# Patient Record
Sex: FEMALE | Race: WHITE
[De-identification: names, ages, dates, MRNs, and addresses within clinical notes are randomized per-mention and may not be internally consistent; named-entity substitution may affect disease eponyms.]

---

## 2017-04-18 ENCOUNTER — HOSPITAL ENCOUNTER (OUTPATIENT)
Dept: HOSPITAL 84 - D.NM | Age: 82
Discharge: HOME | End: 2017-04-18
Attending: FAMILY MEDICINE
Payer: MEDICARE

## 2017-04-18 VITALS — BODY MASS INDEX: 29.9 KG/M2

## 2017-04-18 DIAGNOSIS — K62.5: Primary | ICD-10-CM

## 2017-05-29 ENCOUNTER — HOSPITAL ENCOUNTER (OUTPATIENT)
Dept: HOSPITAL 84 - D.ER | Age: 82
Setting detail: OBSERVATION
LOS: 2 days | Discharge: HOME | End: 2017-05-31
Attending: FAMILY MEDICINE | Admitting: FAMILY MEDICINE
Payer: MEDICARE

## 2017-05-29 VITALS
HEIGHT: 66 IN | BODY MASS INDEX: 27.16 KG/M2 | BODY MASS INDEX: 27.16 KG/M2 | WEIGHT: 169 LBS | WEIGHT: 169 LBS | BODY MASS INDEX: 27.16 KG/M2 | HEIGHT: 66 IN

## 2017-05-29 VITALS — SYSTOLIC BLOOD PRESSURE: 115 MMHG | DIASTOLIC BLOOD PRESSURE: 49 MMHG

## 2017-05-29 VITALS — DIASTOLIC BLOOD PRESSURE: 49 MMHG | SYSTOLIC BLOOD PRESSURE: 115 MMHG

## 2017-05-29 DIAGNOSIS — Z79.01: ICD-10-CM

## 2017-05-29 DIAGNOSIS — K58.0: ICD-10-CM

## 2017-05-29 DIAGNOSIS — E78.5: ICD-10-CM

## 2017-05-29 DIAGNOSIS — K57.90: ICD-10-CM

## 2017-05-29 DIAGNOSIS — I48.91: ICD-10-CM

## 2017-05-29 DIAGNOSIS — K64.8: Primary | ICD-10-CM

## 2017-05-29 DIAGNOSIS — F32.9: ICD-10-CM

## 2017-05-29 LAB
ALBUMIN SERPL-MCNC: 3.6 G/DL (ref 3.4–5)
ALP SERPL-CCNC: 75 U/L (ref 46–116)
ALT SERPL-CCNC: 16 U/L (ref 10–68)
ANION GAP SERPL CALC-SCNC: 12.5 MMOL/L (ref 8–16)
APTT BLD: 31.7 SECONDS (ref 22.8–39.4)
BASOPHILS NFR BLD AUTO: 0.6 % (ref 0–2)
BILIRUB SERPL-MCNC: 0.91 MG/DL (ref 0.2–1.3)
BUN SERPL-MCNC: 25 MG/DL (ref 7–18)
CALCIUM SERPL-MCNC: 10 MG/DL (ref 8.5–10.1)
CHLORIDE SERPL-SCNC: 104 MMOL/L (ref 98–107)
CO2 SERPL-SCNC: 29.1 MMOL/L (ref 21–32)
CREAT SERPL-MCNC: 1 MG/DL (ref 0.6–1.3)
EOSINOPHIL NFR BLD: 3.9 % (ref 0–7)
ERYTHROCYTE [DISTWIDTH] IN BLOOD BY AUTOMATED COUNT: 13.1 % (ref 11.5–14.5)
GLOBULIN SER-MCNC: 4.1 G/L
GLUCOSE SERPL-MCNC: 108 MG/DL (ref 74–106)
HCT VFR BLD CALC: 37.9 % (ref 36–48)
HCT VFR BLD CALC: 40.6 % (ref 36–48)
HGB BLD-MCNC: 12.7 G/DL (ref 12–16)
HGB BLD-MCNC: 13.4 G/DL (ref 12–16)
IMM GRANULOCYTES NFR BLD: 0.1 % (ref 0–5)
INR PPP: 1.51 (ref 0.85–1.17)
LYMPHOCYTES NFR BLD AUTO: 36.1 % (ref 15–50)
MCH RBC QN AUTO: 30.6 PG (ref 26–34)
MCHC RBC AUTO-ENTMCNC: 33 G/DL (ref 31–37)
MCV RBC: 92.7 FL (ref 80–100)
MONOCYTES NFR BLD: 13 % (ref 2–11)
NEUTROPHILS NFR BLD AUTO: 46.3 % (ref 40–80)
OSMOLALITY SERPL CALC.SUM OF ELEC: 287 MOSM/KG (ref 275–300)
PLATELET # BLD: 126 10X3/UL (ref 130–400)
PMV BLD AUTO: 12.9 FL (ref 7.4–10.4)
POTASSIUM SERPL-SCNC: 3.6 MMOL/L (ref 3.5–5.1)
PROT SERPL-MCNC: 7.7 G/DL (ref 6.4–8.2)
PROTHROMBIN TIME: 18.1 SECONDS (ref 11.6–15)
RBC # BLD AUTO: 4.38 10X6/UL (ref 4–5.4)
SODIUM SERPL-SCNC: 142 MMOL/L (ref 136–145)
WBC # BLD AUTO: 7.7 10X3/UL (ref 4.8–10.8)

## 2017-05-29 NOTE — PRO
PATIENT:ANTONIA BURNS                             MEDICAL RECORD: [id number]
: 33                                            LOCATION:[facility]
                                                         ADMISSION DATE: 17
 
PROCEDURE PERFORMED BY: JULIO CÉSAR PATEL MD              
 
 
DATE OF PROCEDURE:  2017
 
:  Julio César Patel MD.
 
PROCEDURE:  Flexible sigmoidoscopy with hemorrhoidal banding times 5.
 
INDICATION:  The patient is an 84-year-old white female who is basically
admitted with recurrent problems of hematochezia since starting Xarelto.  She
had a colonoscopy a little less than 10 years ago that was normal, there is
evidence of small internal hemorrhoids and mild sigmoid diverticulosis.  We have
not seen her since.  Over the past year or so, since she started Xarelto, she
has off and on hematochezia.  She did require transfusion as of yet.  She is now
for sigmoidoscopy.
 
PREMEDICATION:  TIVA anesthesia.
 
INSTRUMENT:  Olympus video adjustable colonoscope and then gastroscope.
 
FINDINGS:  Rectal exam was normal.  The colonoscope passed through the rectum
into the splenic flexure without difficulty.  The exam was remarkable for brown
stool, mild sigmoid diverticulosis and moderate friable internal hemorrhoids on
retroflexion of the rectum.  There were no mass, lesions or polypoid lesions
seen.
 
I then elected to band her hemorrhoids.  This was done by removing the
colonoscope and placing 7-shooter  on the tip of the gastroscope and
inserting into the patient.  Using retroflexed view, I placed 5 hemorrhoid bands
just proximal to the dentate line in a circumferential manner.  The patient
tolerated the procedure well without any immediate complications.
 
IMPRESSION:
1.  Moderate-sized friable internal hemorrhoids, now status post hemorrhoidal
banding times 5 as noted above.
2.  Mild sigmoid diverticulosis.
3.  Otherwise normal sigmoidoscopy.
4.  Hematochezia obviously due to her hemorrhoids and Xarelto
 
PLAN:
1.  Avoid constipation and straining with bowel movement.
2.  We will see her on as needed basis.
3.  Consider repeat hemorrhoid banding in the future as needed.
 
TRANSINT:[id number] Voice Confirmation ID: [id number] DOCUMENT ID: [id number]
                                           
 
 
 
PROCEDURE NOTE                                 [id number]    ANTONIA BURNS    
 
 
                                           JULIO CÉSAR PATEL MD              
 
 
 
 
 
 
 
 
 
 
 
 
 
 
 
 
 
 
 
 
 
 
 
 
 
 
 
 
 
 
 
 
 
 
 
 
 
 
 
 
 
 
 
 
 
CC: JULIO CÉSAR PEREIRA MD                                  [id number]
DICTATION DATE: 17 1235     :     17 1054      DIS IN  
                                                                      17
[facility]                                          
[address] [place] [address]

## 2017-05-29 NOTE — NUR
REC'D PER STRETCHER FROM ER DEPT AN 85 Y/O W/F PER SERVICES DR. PEREIRA
WITH DX.LOWER GI BLEED. SALINE LOCK PATENT LEFT HAND. SITE CLEAR ALLERGY:MILK.
SPLINT TO RT INDEX FINGER FROM A RECENT FX FROM A FALL.

## 2017-05-30 VITALS — DIASTOLIC BLOOD PRESSURE: 50 MMHG | SYSTOLIC BLOOD PRESSURE: 116 MMHG

## 2017-05-30 VITALS — SYSTOLIC BLOOD PRESSURE: 136 MMHG | DIASTOLIC BLOOD PRESSURE: 56 MMHG

## 2017-05-30 VITALS — DIASTOLIC BLOOD PRESSURE: 56 MMHG | SYSTOLIC BLOOD PRESSURE: 136 MMHG

## 2017-05-30 VITALS — DIASTOLIC BLOOD PRESSURE: 48 MMHG | SYSTOLIC BLOOD PRESSURE: 116 MMHG

## 2017-05-30 VITALS — DIASTOLIC BLOOD PRESSURE: 52 MMHG | SYSTOLIC BLOOD PRESSURE: 110 MMHG

## 2017-05-30 LAB
HCT VFR BLD CALC: 36.6 % (ref 36–48)
HCT VFR BLD CALC: 38.2 % (ref 36–48)
HGB BLD-MCNC: 12 G/DL (ref 12–16)
HGB BLD-MCNC: 12.4 G/DL (ref 12–16)

## 2017-05-30 NOTE — NUR
PATIENT IS LAYING IN BED, HOB 40 DEGREES. RESPIRATIONS ARE EVEN AND UNLABORED.
NO SIGNS OF DISTRESS NOTED. BED IN LOWEST POSITION, CALL LIGHT IN REACH. BED
RIALS UP X'S 2. FINGER SPLINT ON AND INTACT. PATIENT'S FAMILY MEMBER IS AT
BEDSIDE, BOTH DENY NEEDS AT THIS TIME.

## 2017-05-30 NOTE — NUR
ASSESSMENT AS PER FLOWSHEET. IV PATENT LEFT HAND OF NS AT 75CC'S/HR SITE
CLEAR. SPLINT IMMOBILIZER IN PLACE TO RT INDEX FINGER. RESTING QUIETLY DENIES
NEEDS. SR UP X2 CALL LIGHT WITHIN REACH.

## 2017-05-30 NOTE — NUR
PATIENT IN MID NUNEZ POSITION RESTING QUIETLY. RESPIRATIONS EVEN AND
UNLABORED. DENIES NEEDS. SIDE RAILS UP X2. BED IN LOW POSITION. CALL LIGHT IN
REACH.

## 2017-05-30 NOTE — HP
PATIENT: ANTONIA BURNS                            MEDICAL RECORD: [id number]
ACCOUNT: [id number]                                    LOCATION:[facility]
: 33                                            ADMISSION DATE: 17
                                                         
 
                             HISTORY AND PHYSICAL EXAMINATION
 
 
DATE OF ADMISSION:  2017.
 
CHIEF COMPLAINT:  Blood per rectum.
 
HISTORY OF PRESENT ILLNESS:  This is an 84-year-old female followed by Dr. Combs in our office.  She tells me that she has had rectal bleeding for a
couple of days now, is bright red, she may have a little pain, but not too much.
 I have reviewed records from our office and the patient was actually seen by
Dr. Combs on 2017 complaining of bright red blood per rectum.  She
had some blood work done and I asked the patient if she remembered going to see
Dr. Combs about this visit, she stated she does not remember that at all. 
____ note the patient has an acute memory issue or she has long-term or
worsening issues.  She did have heme-positive rectal test in the ER.  Her
hemoglobin is 12.7, hematocrit is 37.9.  Review of family medicine clinic
records on 2017, her hemoglobin was 12.5 and hematocrit 38.8.
 
PAST MEDICAL AND SURGICAL HISTORY:  The patient has arthritis, atrial
fibrillation, depression, hyperlipidemia, irritable bowel syndrome.
 
PAST SURGICAL HISTORY:  Hysterectomy.
 
HOME MEDICATIONS:  (This is the best list that I can come up which she takes
Viberzi 100 mg daily, Voltaren gel daily, citalopram 10 mg daily, pravastatin 10
mg daily, bisoprolol/HCTZ 2.5/6.25 once a day, Xarelto 20 mg once a day.  She
takes tramadol p.r.n. pain, hydrocodone 5 p.r.n. pain, cyclobenzaprine 5 p.r.n.
muscle spasm.
 
DRUG ALLERGIES:  Unknown.
 
HABITS:  Never smoked.  No alcohol or drugs.
 
SOCIAL HISTORY:  She is retired, lives in the [facility] with assisted living.  Her
daughter lives ____ and is her POA.
 
FAMILY HISTORY:  Parents are .  Father  reportedly of renal cell
cancer.
 
REVIEW OF SYSTEMS:
GENERAL:  She denies any major weight changes.
HEENT:  No particular sinus or allergy problems.
RESPIRATORY:  No history of asthma or emphysema.
CARDIAC:  No known coronary artery disease.  She has atrial fibrillation, on
Xarelto for the last few months (reported that she was on Coumadin prior to
that).
GASTROINTESTINAL:  She has irritable bowel.  Dr. Rock is her
gastroenterologist since initial exam on 2017.
MUSCULOSKELETAL:  She has arthritic aches and pains.
NEUROLOGIC:  No significant headaches or seizures.  Her mental status is
unknown.  The patient states her daughter can answer those questions.
PSYCHIATRIC:  She has some depression.
 
 
 
HISTORY AND PHYSICAL                           [id number]    ANTONIA BURNS    
 
 
 
PHYSICAL EXAMINATION:
VITAL SIGNS:  Today, temperature 98.1, pulse 60, respirations 20, blood pressure
115/49, O2 sat 93%.
GENERAL:  She is easily awakened tonight.  She does not appear in acute
distress.
SKIN:  Warm and dry.
HEENT:  Grossly within normal limits.
NECK:  Supple.  No JVD or bruit.
HEART:  Regular rate and rhythm at this time.
LUNGS:  Fairly clear.
ABDOMEN:  Soft, flat, nontender.
EXTREMITIES:  No edema.
 
LABORATORY WORK:  CBC with a white count 7700, hemoglobin 12.7, hematocrit 37.9,
platelets number 126,000.  INR 1.5.  LFTs are normal.  Basic metabolic panel is
okay except BUN 25.  The last CBC and basic metabolic panel in our office on
2017, these were similar, hemoglobin was 12.5, hematocrit 38.8.  Rectal
exam in the ER was reportedly heme-positive.
 
ASSESSMENT:
1.  Heme-positive stool.
2.  Atrial fibrillation on Xarelto.
 
PLAN:  Gastrointestinal has been consulted, ____ and Dr. Rock's nurse
practitioner note from 2017, the patient does not wish to have any further
colonoscopies, so not really sure about further workup is going be done.  Again
over a month ago, her hemoglobin was actually a little bit lower than it is now.
 Dr. Combs had ordered a bleeding scan and that was done at [facility]
on 2017.  The bleeding scan showed no obvious signs of bleeding.  Other
tests and procedures as warranted.
 
TRANSINT:[id number] Voice Confirmation ID: [id number] DOCUMENT ID: [id number]
 
 
                                           
                                           MERARI HERRERA MD              
 
 
 
Electronically Signed by MERARI HERRERA on 17 at 2122
 
 
 
 
 
 
CC:                                                             [id number]
DICTATION DATE: 17     :     17 0110      ADM IN  
                                                                              
[facility]                                          
[address] [id number]

## 2017-05-31 VITALS — SYSTOLIC BLOOD PRESSURE: 120 MMHG | DIASTOLIC BLOOD PRESSURE: 67 MMHG

## 2017-05-31 VITALS — DIASTOLIC BLOOD PRESSURE: 66 MMHG | SYSTOLIC BLOOD PRESSURE: 153 MMHG

## 2017-05-31 LAB
ANION GAP SERPL CALC-SCNC: 9.5 MMOL/L (ref 8–16)
BASOPHILS NFR BLD AUTO: 0.2 % (ref 0–2)
BUN SERPL-MCNC: 14 MG/DL (ref 7–18)
CALCIUM SERPL-MCNC: 9.1 MG/DL (ref 8.5–10.1)
CHLORIDE SERPL-SCNC: 107 MMOL/L (ref 98–107)
CO2 SERPL-SCNC: 26.9 MMOL/L (ref 21–32)
CREAT SERPL-MCNC: 1 MG/DL (ref 0.6–1.3)
EOSINOPHIL NFR BLD: 2.2 % (ref 0–7)
ERYTHROCYTE [DISTWIDTH] IN BLOOD BY AUTOMATED COUNT: 12.9 % (ref 11.5–14.5)
GLUCOSE SERPL-MCNC: 109 MG/DL (ref 74–106)
HCT VFR BLD CALC: 37.2 % (ref 36–48)
HCT VFR BLD CALC: 39 % (ref 36–48)
HGB BLD-MCNC: 12.1 G/DL (ref 12–16)
HGB BLD-MCNC: 12.9 G/DL (ref 12–16)
IMM GRANULOCYTES NFR BLD: 0.2 % (ref 0–5)
LYMPHOCYTES NFR BLD AUTO: 19.7 % (ref 15–50)
MCH RBC QN AUTO: 29.7 PG (ref 26–34)
MCHC RBC AUTO-ENTMCNC: 32.5 G/DL (ref 31–37)
MCV RBC: 91.4 FL (ref 80–100)
MONOCYTES NFR BLD: 11.9 % (ref 2–11)
NEUTROPHILS NFR BLD AUTO: 65.8 % (ref 40–80)
OSMOLALITY SERPL CALC.SUM OF ELEC: 280 MOSM/KG (ref 275–300)
PLATELET # BLD: 115 10X3/UL (ref 130–400)
PMV BLD AUTO: 12.8 FL (ref 7.4–10.4)
POTASSIUM SERPL-SCNC: 3.4 MMOL/L (ref 3.5–5.1)
RBC # BLD AUTO: 4.07 10X6/UL (ref 4–5.4)
SODIUM SERPL-SCNC: 140 MMOL/L (ref 136–145)
WBC # BLD AUTO: 9.1 10X3/UL (ref 4.8–10.8)

## 2017-05-31 NOTE — CN
PATIENT NAME:ANTONIA BURNS                          MEDICAL RECORD: [id number]
: 33                                              LOCATION:[facility]
ADMIT DATE: 17                                       ACCOUNT: [id number]
CONSULTING PHYSICIAN:    JULIO CÉSAR VERMA MD              
                                               
REFERRING PHYSICIAN:     JULIO CÉSAR PEREIRA MD    
 
 
DATE OF CONSULTATION:  2017
 
GASTROINTESTINAL CONSULTATION
 
DATE OF CONSULTATION:  2017.
 
REFERRING PHYSICIAN:  Dr. Pereira.
 
HISTORY OF PRESENT ILLNESS:  The patient is an 84-year-old white female with
history of dementia, lives at the [facility], basically was admitted because of
chronic recurrent hematochezia felt to be hemorrhoidal in nature.  She had
intermittent episodes of bleeding ever since she started Xarelto several months
ago.  On chart review, she does have a remote history of colon polyps underwent
a surveillance colonoscopy in 2008, there was normal other than mild
sigmoid diverticulosis and small internal hemorrhoids.  I saw her once again in
2009 because of dysphagia.  She underwent an EGD that was basically
normal other than minimal stenosis requiring dilation of her esophagus with a
54-Tajik Savary dilator.  I have not seen her since then.
 
PAST MEDICAL HISTORY:  As above.  She has cerebrovascular disease, status post
CVA.  She has AFib.  She has DJD, depression, hyperlipidemia and IBS.
 
PAST SURGICAL HISTORY:  Remarkable for hysterectomy.
 
HOME MEDICATIONS:  Include Viberzi, Voltaren gel, Celexa, pravastatin,
bisoprolol/HCTZ, Xarelto, tramadol, hydrocodone, cyclobenzaprine.
 
ALLERGIES:  No known drug allergies.
 
SOCIAL HISTORY:  The patient is a nonsmoker, nondrinker.
 
FAMILY HISTORY:  Negative for GI disease.
 
REVIEW OF SYSTEMS:  Noncontributory other than HPI.
 
PHYSICAL EXAMINATION:
GENERAL:  Reveals an elderly, somewhat demented white female in no acute
distress.
VITAL SIGNS:  Stable.  She is afebrile.
CHEST:  Clear.
HEART:  Regular rate and rhythm.
ABDOMEN:  Soft, nontender.
EXTREMITIES:  Reveal no edema.
 
LABORATORY DATA:  Reveals a white count of 7000, hematocrit 37, MCV of 92,
platelet count 126,000.  Electrolytes normal.  BUN and creatinine are normal. 
Liver enzymes are normal.  INR is 1.5.
 
IMPRESSION:
1.  Chronic intermittent hematochezia, almost certainly due to internal
 
 
 
CONSULT REPORT                                 [id number]    HENEGAR,THORDES ANGELO        
 
 
hemorrhoids on top of Xarelto use.  However, it has been 9 years since
colonoscopy and so other etiologies have to be considered.
2.  Remote history of colon polyps.
3.  History of dementia and CVA and AFib, on Xarelto.
 
RECOMMENDATION:  Flex sig with possible hemorrhoidal banding in a.m.
 
TRANSINT:[id number] Voice Confirmation ID: [id number] DOCUMENT ID: [id number]
                                           
                                           JULIO CÉSAR VERMA MD              
 
 
 
Electronically Signed by JULIO CÉSAR VERMA on 17 at 1134
 
 
 
 
 
 
 
 
 
 
 
 
 
 
 
 
 
 
 
 
 
 
 
 
 
 
 
 
 
 
 
 
 
CC: JULIO CÉSAR PEREIRA MD                                  [id number]
DICTATION DATE: 17 180     :     17 0154      ADM IN  
                                                                              
[facility]                                          
[address] [place] [address]

## 2017-05-31 NOTE — NUR
PT ASLEEP AT THIS TIME WITH NO VISABLE SIGNS OF PAIN OR DISCOMFORT. BED IN LOW
POSITION AND CALL LIGHT WITHIN REACH. WILL CONTINUE TO MONITOR.

## 2018-02-22 ENCOUNTER — HOSPITAL ENCOUNTER (OUTPATIENT)
Dept: HOSPITAL 84 - D.LABREF | Age: 83
Discharge: HOME | End: 2018-02-22
Attending: FAMILY MEDICINE
Payer: MEDICARE

## 2018-02-22 VITALS — BODY MASS INDEX: 27.2 KG/M2

## 2018-02-22 DIAGNOSIS — R53.1: ICD-10-CM

## 2018-02-22 DIAGNOSIS — N39.0: Primary | ICD-10-CM

## 2018-02-22 LAB
APPEARANCE UR: CLEAR
BACTERIA #/AREA URNS HPF: (no result) /HPF
BILIRUB SERPL-MCNC: NEGATIVE MG/DL
COLOR UR: (no result)
GLUCOSE SERPL-MCNC: NEGATIVE MG/DL
KETONES UR STRIP-MCNC: NEGATIVE MG/DL
NITRITE UR-MCNC: POSITIVE MG/ML
PH UR STRIP: 5 [PH] (ref 5–6)
PROT UR-MCNC: NEGATIVE MG/DL
RBC #/AREA URNS HPF: (no result) /HPF (ref 0–5)
SP GR UR STRIP: 1.01 (ref 1–1.02)
SQUAMOUS #/AREA URNS HPF: (no result) /HPF (ref 0–5)
UROBILINOGEN UR-MCNC: NORMAL MG/DL
WBC #/AREA URNS HPF: (no result) /HPF (ref 0–5)